# Patient Record
Sex: FEMALE | Race: WHITE | URBAN - METROPOLITAN AREA
[De-identification: names, ages, dates, MRNs, and addresses within clinical notes are randomized per-mention and may not be internally consistent; named-entity substitution may affect disease eponyms.]

---

## 2017-03-20 ENCOUNTER — EMERGENCY (EMERGENCY)
Facility: HOSPITAL | Age: 27
LOS: 0 days | Discharge: HOME | End: 2017-03-20

## 2017-06-27 DIAGNOSIS — R07.81 PLEURODYNIA: ICD-10-CM

## 2017-06-27 DIAGNOSIS — Z88.5 ALLERGY STATUS TO NARCOTIC AGENT: ICD-10-CM

## 2017-06-27 DIAGNOSIS — Z87.891 PERSONAL HISTORY OF NICOTINE DEPENDENCE: ICD-10-CM

## 2017-06-27 DIAGNOSIS — R10.9 UNSPECIFIED ABDOMINAL PAIN: ICD-10-CM

## 2019-02-02 ENCOUNTER — OUTPATIENT (OUTPATIENT)
Dept: OUTPATIENT SERVICES | Facility: HOSPITAL | Age: 29
LOS: 1 days | Discharge: HOME | End: 2019-02-02

## 2019-02-02 DIAGNOSIS — Z76.1 ENCOUNTER FOR HEALTH SUPERVISION AND CARE OF FOUNDLING: ICD-10-CM

## 2019-02-08 ENCOUNTER — OUTPATIENT (OUTPATIENT)
Dept: OUTPATIENT SERVICES | Facility: HOSPITAL | Age: 29
LOS: 1 days | Discharge: HOME | End: 2019-02-08

## 2019-02-08 DIAGNOSIS — E55.9 VITAMIN D DEFICIENCY, UNSPECIFIED: ICD-10-CM

## 2019-02-08 DIAGNOSIS — D51.8 OTHER VITAMIN B12 DEFICIENCY ANEMIAS: ICD-10-CM

## 2019-02-08 DIAGNOSIS — E03.9 HYPOTHYROIDISM, UNSPECIFIED: ICD-10-CM

## 2019-02-08 DIAGNOSIS — B20 HUMAN IMMUNODEFICIENCY VIRUS [HIV] DISEASE: ICD-10-CM

## 2019-02-08 DIAGNOSIS — E78.5 HYPERLIPIDEMIA, UNSPECIFIED: ICD-10-CM

## 2019-02-08 DIAGNOSIS — N39.0 URINARY TRACT INFECTION, SITE NOT SPECIFIED: ICD-10-CM

## 2019-02-08 DIAGNOSIS — D53.9 NUTRITIONAL ANEMIA, UNSPECIFIED: ICD-10-CM

## 2019-02-08 DIAGNOSIS — Z00.00 ENCOUNTER FOR GENERAL ADULT MEDICAL EXAMINATION WITHOUT ABNORMAL FINDINGS: ICD-10-CM

## 2019-02-08 DIAGNOSIS — E11.9 TYPE 2 DIABETES MELLITUS WITHOUT COMPLICATIONS: ICD-10-CM

## 2019-03-19 ENCOUNTER — OUTPATIENT (OUTPATIENT)
Dept: OUTPATIENT SERVICES | Facility: HOSPITAL | Age: 29
LOS: 1 days | Discharge: HOME | End: 2019-03-19

## 2019-03-19 DIAGNOSIS — B20 HUMAN IMMUNODEFICIENCY VIRUS [HIV] DISEASE: ICD-10-CM

## 2019-06-28 ENCOUNTER — OUTPATIENT (OUTPATIENT)
Dept: OUTPATIENT SERVICES | Facility: HOSPITAL | Age: 29
LOS: 1 days | Discharge: HOME | End: 2019-06-28

## 2019-06-28 DIAGNOSIS — E55.9 VITAMIN D DEFICIENCY, UNSPECIFIED: ICD-10-CM

## 2019-06-28 DIAGNOSIS — E11.9 TYPE 2 DIABETES MELLITUS WITHOUT COMPLICATIONS: ICD-10-CM

## 2019-06-28 DIAGNOSIS — E78.5 HYPERLIPIDEMIA, UNSPECIFIED: ICD-10-CM

## 2019-06-28 DIAGNOSIS — B20 HUMAN IMMUNODEFICIENCY VIRUS [HIV] DISEASE: ICD-10-CM

## 2019-06-28 DIAGNOSIS — D53.9 NUTRITIONAL ANEMIA, UNSPECIFIED: ICD-10-CM

## 2019-06-28 DIAGNOSIS — E03.9 HYPOTHYROIDISM, UNSPECIFIED: ICD-10-CM

## 2019-10-29 ENCOUNTER — APPOINTMENT (OUTPATIENT)
Dept: CARDIOTHORACIC SURGERY | Facility: CLINIC | Age: 29
End: 2019-10-29
Payer: COMMERCIAL

## 2019-10-29 VITALS
OXYGEN SATURATION: 98 % | RESPIRATION RATE: 13 BRPM | DIASTOLIC BLOOD PRESSURE: 74 MMHG | HEIGHT: 64 IN | WEIGHT: 220 LBS | BODY MASS INDEX: 37.56 KG/M2 | TEMPERATURE: 98.3 F | SYSTOLIC BLOOD PRESSURE: 114 MMHG | HEART RATE: 64 BPM

## 2019-10-29 DIAGNOSIS — F17.200 NICOTINE DEPENDENCE, UNSPECIFIED, UNCOMPLICATED: ICD-10-CM

## 2019-10-29 DIAGNOSIS — S29.9XXD UNSPECIFIED INJURY OF THORAX, SUBSEQUENT ENCOUNTER: ICD-10-CM

## 2019-10-29 DIAGNOSIS — B20 HUMAN IMMUNODEFICIENCY VIRUS [HIV] DISEASE: ICD-10-CM

## 2019-10-29 DIAGNOSIS — Z78.9 OTHER SPECIFIED HEALTH STATUS: ICD-10-CM

## 2019-10-29 DIAGNOSIS — Z83.3 FAMILY HISTORY OF DIABETES MELLITUS: ICD-10-CM

## 2019-10-29 PROBLEM — Z00.00 ENCOUNTER FOR PREVENTIVE HEALTH EXAMINATION: Status: ACTIVE | Noted: 2019-10-29

## 2019-10-29 PROCEDURE — 99242 OFF/OP CONSLTJ NEW/EST SF 20: CPT

## 2019-10-29 RX ORDER — ABACAVIR SULFATE, DOLUTEGRAVIR SODIUM, LAMIVUDINE 600; 50; 300 MG/1; MG/1; MG/1
600-50-300 TABLET, FILM COATED ORAL
Refills: 0 | Status: ACTIVE | COMMUNITY
Start: 2019-10-29

## 2019-10-29 NOTE — PHYSICAL EXAM
[General Appearance - Alert] : alert [General Appearance - In No Acute Distress] : in no acute distress [] : no respiratory distress [Auscultation Breath Sounds / Voice Sounds] : lungs were clear to auscultation bilaterally [Heart Rate And Rhythm] : heart rate was normal and rhythm regular [Heart Sounds] : normal S1 and S2 [Heart Sounds Gallop] : no gallops [Murmurs] : no murmurs [Heart Sounds Pericardial Friction Rub] : no pericardial rub [Abnormal Walk] : normal gait [Nail Clubbing] : no clubbing  or cyanosis of the fingernails [Musculoskeletal - Swelling] : no joint swelling seen [Motor Tone] : muscle strength and tone were normal [Deep Tendon Reflexes (DTR)] : deep tendon reflexes were 2+ and symmetric [Sensation] : the sensory exam was normal to light touch and pinprick [No Focal Deficits] : no focal deficits [Oriented To Time, Place, And Person] : oriented to person, place, and time [Impaired Insight] : insight and judgment were intact [Affect] : the affect was normal

## 2019-11-01 ENCOUNTER — OUTPATIENT (OUTPATIENT)
Dept: OUTPATIENT SERVICES | Facility: HOSPITAL | Age: 29
LOS: 1 days | Discharge: HOME | End: 2019-11-01

## 2019-11-01 DIAGNOSIS — S29.9XXD: ICD-10-CM

## 2019-11-01 LAB
ANION GAP SERPL CALC-SCNC: 15 MMOL/L
BUN SERPL-MCNC: 17 MG/DL
CALCIUM SERPL-MCNC: 9.3 MG/DL
CHLORIDE SERPL-SCNC: 102 MMOL/L
CO2 SERPL-SCNC: 25 MMOL/L
CREAT SERPL-MCNC: 0.8 MG/DL
GLUCOSE SERPL-MCNC: 89 MG/DL
POTASSIUM SERPL-SCNC: 3.9 MMOL/L
SODIUM SERPL-SCNC: 142 MMOL/L
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
## 2019-11-05 NOTE — DATA REVIEWED
[FreeTextEntry1] : EXAM: CT CHEST IC -  2017 \par \par Patient Name: KAELA AMARAL : 1990 \par Patient ID: [id number] \par Account: [id number] \par Patient Location: Replaced by [facility] [phone] \par \par Accession: [id number] \par Procedure: CT CHEST W CONTRAST [id number] \par Date of Exam: 2017 05:31 PM \par Attending Physician: ED UNASSIGNED \par Requesting Physician: MICHELE DONAHUE MD \par \par \par Clinical History / Reason for exam: Left rib and back pain, rule out pulmonary embolism. \par \par CT evaluation of the chest was performed after 100 cc of Optiray 320 intravenous contrast utilizing a pulmonary embolism protocol. Study is compared to chest x-ray of same day. \par \par There is no evidence of central pulmonary embolism. \par \par The heart size within normal limits. There is no pericardial effusion. The thoracic aorta is normal in caliber. \par \par Evaluation the upper abdomen is limited. \par \par There is no evidence of thoracic adenopathy. \par \par The osseous structures are unremarkable for age. \par \par The tracheobronchial tree is patent. There is no consolidation, pneumothorax or pleural effusion. The extreme lung bases are not imaged. \par \par Impression \par \par No evidence of central pulmonary embolism.
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
## 2023-03-08 ENCOUNTER — EMERGENCY (EMERGENCY)
Facility: HOSPITAL | Age: 33
LOS: 0 days | Discharge: HOME | End: 2023-03-08
Attending: STUDENT IN AN ORGANIZED HEALTH CARE EDUCATION/TRAINING PROGRAM
Payer: COMMERCIAL

## 2023-03-08 VITALS
SYSTOLIC BLOOD PRESSURE: 120 MMHG | RESPIRATION RATE: 18 BRPM | TEMPERATURE: 97 F | HEART RATE: 70 BPM | DIASTOLIC BLOOD PRESSURE: 59 MMHG | OXYGEN SATURATION: 99 %

## 2023-03-08 DIAGNOSIS — Z88.5 ALLERGY STATUS TO NARCOTIC AGENT: ICD-10-CM

## 2023-03-08 DIAGNOSIS — K02.9 DENTAL CARIES, UNSPECIFIED: ICD-10-CM

## 2023-03-08 DIAGNOSIS — K04.7 PERIAPICAL ABSCESS WITHOUT SINUS: ICD-10-CM

## 2023-03-08 DIAGNOSIS — K08.89 OTHER SPECIFIED DISORDERS OF TEETH AND SUPPORTING STRUCTURES: ICD-10-CM

## 2023-03-08 PROCEDURE — 99283 EMERGENCY DEPT VISIT LOW MDM: CPT

## 2023-03-08 PROCEDURE — 99284 EMERGENCY DEPT VISIT MOD MDM: CPT

## 2023-03-08 NOTE — ED PROVIDER NOTE - PHYSICAL EXAMINATION
Physical Exam    Vital Signs: I have reviewed the initial vital signs.  Constitutional: well-nourished, appears stated age, no acute distress  Dental: + cavity to tooth #4 with swelling to right cheek area  Neuro: AOx3, No focal deficits noted

## 2023-03-08 NOTE — ED PROVIDER NOTE - OBJECTIVE STATEMENT
31-year-old female complaining of right upper tooth pain since yesterday and today with  swelling to her face.  No fevers

## 2023-03-08 NOTE — ED PROVIDER NOTE - NS ED ATTENDING STATEMENT MOD
This was a shared visit with the SIL. I reviewed and verified the documentation and independently performed the documented:

## 2023-03-08 NOTE — ED PROVIDER NOTE - NS ED ROS FT
Constitutional: (-) fever  Dental: (+) toothache, face swelling  Neurological: (-) altered mental status

## 2023-03-08 NOTE — ED PROVIDER NOTE - CLINICAL SUMMARY MEDICAL DECISION MAKING FREE TEXT BOX
pt p/w dental pain; no fever, voice change, inability to tolerate po. pt well appearing, mmm, + ttp with dental carries to tooth #4, uvula midline, no PTA, no tongue elevation or ttp floor of mouth, no lymphadenopathy. pt escorted to dental for definitive treatment.

## 2023-03-08 NOTE — CONSULT NOTE ADULT - SUBJECTIVE AND OBJECTIVE BOX
Patient is a 32y old  Female who presents with a chief complaint of right facial swelling, tooth pain, broken tooth.     HPI: Tooth #4 was broken for years as per patient. Pain developed 2 days ago and facial swelling developed overnight.       PAST MEDICAL & SURGICAL HISTORY:  No pertinent past medical history        (  - ) heart valve replacement  (- ) joint replacement  ( -  ) pregnancy    MEDICATIONS  (STANDING): none    MEDICATIONS  (PRN): none      Allergies    codeine (Unknown)        *SOCIAL HISTORY: ( -  ) Tobacco; ( -  ) ETOH    *Last Dental Visit: year ago    Vital Signs Last 24 Hrs  T(C): 36.3 (08 Mar 2023 07:29), Max: 36.3 (08 Mar 2023 07:29)  T(F): 97.4 (08 Mar 2023 07:29), Max: 97.4 (08 Mar 2023 07:29)  HR: 70 (08 Mar 2023 07:29) (70 - 70)  BP: 120/59 (08 Mar 2023 07:29) (120/59 - 120/59)  BP(mean): --  RR: 18 (08 Mar 2023 07:29) (18 - 18)  SpO2: 99% (08 Mar 2023 07:29) (99% - 99%)    Parameters below as of 08 Mar 2023 07:29  Patient On (Oxygen Delivery Method): room air        EOE:  TMJ ( -  ) clicks                     ( -  ) pops                     ( -  ) crepitus             Mandible <<FROM>>             Facial bones and MOM <<grossly intact>>             (-   ) trismus             ( -  ) lymphadenopathy             ( +  ) swelling: right side             ( +  ) asymmetry: right side             ( +  ) palpation: right side             ( -  ) dyspnea             ( -  ) dysphagia             ( -  ) loss of consciousness    IOE:  permanent dentition: multiple carious teeth and multiple missing teeth           hard/soft palate:  ( - ) palatal torus, <<No pathology noted>>           tongue/FOM <<No pathology noted>>           labial/buccal mucosa <<No pathology noted>>           (  + ) percussion: #4           ( +  ) palpation: #4           ( +  ) swelling: #4            ( +  ) abscess: #4           ( -  ) sinus tract    Dentition present: <<yes  >>  Mobility: <<no  >>  Caries: yes #4         *DENTAL RADIOGRAPHS: 1 periapical image. #4 caries into pulp, periapical radiolucency.      RADIOLOGY & ADDITIONAL STUDIES: none    *ASSESSMENT: Extraoral exam revealed facial swelling on the right mid face, tender to palpation. Intraoral exam revealed swelling in the buccal vestibule in the area of tooth #4. Tooth #4 is non-restorable and needs to be extracted.       *PLAN: Non-surgical extraction #4 with incision and drainage.     PROCEDURE:   Patient refused to render proposed treatment.     RECOMMENDATIONS:  1) Extraction #4  2) Amoxicillin 500mg TID for 1 week, Ibuprofen 600mg every 6 hours as needed.   2) Dental F/U with outpatient dentist for comprehensive dental care.   3) If any difficulty swallowing/breathing, fever occur, return to ER.     Resident Name: Carolee Sheehan  pager #[phone]